# Patient Record
(demographics unavailable — no encounter records)

---

## 2019-06-10 NOTE — UC
Laceration HPI





- HPI Summary


HPI Summary: 





13 yo female fell running down a hill


Lacerated right knee 





occurred about one hour PTA








minimal pain





Td up to date





- History Of Current Complaint


Chief Complaint: UCLaceration


Stated Complaint: KNEE LAC


Time Seen by Provider: 06/10/19 12:30


Hx Obtained From: Patient


Hx Last Menstrual Period: end of may


Laceration Location: Knee


Mechanism Of Injury: Blunt Trauma


Onset/Duration: Sudden Onset


Severity: Mild


Pain Intensity: 0


Pain Scale Used: 0-10 Numeric


Aggravating Factors: Nothing


Full Body (No Head): 


  __________________________














  __________________________





 1 - lac








- Allergies/Home Medications


Allergies/Adverse Reactions: 


 Allergies











Allergy/AdvReac Type Severity Reaction Status Date / Time


 


No Known Allergies Allergy   Verified 06/10/19 12:29











Home Medications: 


 Home Medications





NK [No Home Medications Reported]  06/10/19 [History Confirmed 06/10/19]











PMH/Surg Hx/FS Hx/Imm Hx


Previously Healthy: Yes





- Surgical History


Surgical History: None





- Family History


Known Family History: Positive: Hypertension, Other - CA





- Social History


Alcohol Use: None


Substance Use Type: None


Smoking Status (MU): Never Smoked Tobacco





- Immunization History


Most Recent Tetanus Shot: unsure


Vaccination Up to Date: Yes





Review of Systems


All Other Systems Reviewed And Are Negative: Yes


Constitutional: Positive: Negative


Skin: Positive: Negative


Eyes: Positive: Negative


ENT: Positive: Negative


Respiratory: Positive: Negative


Cardiovascular: Positive: Negative


Gastrointestinal: Positive: Negative


Genitourinary: Positive: Negative


Motor: Positive: Negative


Neurovascular: Positive: Negative


Musculoskeletal: Positive: Negative


Neurological: Positive: Negative


Psychological: Positive: Negative





Physical Exam


Triage Information Reviewed: Yes


Appearance: Well-Appearing, No Pain Distress, Well-Nourished


Vital Signs: 


 Initial Vital Signs











Temp  98.4 F   06/10/19 12:24


 


Pulse  78   06/10/19 12:24


 


Resp  16   06/10/19 12:24


 


BP  106/68   06/10/19 12:24


 


Pulse Ox  100   06/10/19 12:24











Vital Signs Reviewed: Yes


Eyes: Positive: Conjunctiva Clear


ENT: Positive: Hearing grossly normal.  Negative: Nasal congestion, Nasal 

drainage, Trismus, Muffled voice, Hoarse voice


Neck: Positive: Supple, Nontender


Cardiovascular: Positive: RRR, No Murmur


Musculoskeletal: Positive: ROM Intact, No Edema


Neurological: Positive: Alert


Psychological Exam: Normal


Skin Exam: Other - lac right knee





Laceration Repair





- Laceration Repair


  ** 1


Description: Linear


Laceration Size After Repair: Length (cm) - 1.2, Width (mm) - 3, Depth (mm) - 2


Debridement: minimal


Type Injection: Local


Anesthesia Used: 2.0% Lido


Additive Used (in ml): Epi


Cleansing Completed Via Routine Prep: Yes


Irrigation With Pressure Irrigation Device: Yes


Closure Material: Sutures


Closure Method: Single Layer


Suture Of: Skin


Suture Type: Nylon - 4 4-0 nylon





Laceration Course/Dx





- Diagnosis


Provider Diagnosis: 


 Laceration of right knee








Discharge





- Sign-Out/Discharge


Documenting (check all that apply): Patient Departure


All imaging exams completed and their final reports reviewed: No Studies





- Discharge Plan


Condition: Stable


Disposition: HOME


Patient Education Materials:  Laceration (ED)


Referrals: 


Daisy Jhaveri DO [Primary Care Provider] - 


Additional Instructions: 


gently clean twice daily with soap and water





gently dry


aplpy a thin film of ointment (I like aquaphor healing ointment or polytrim)


bandaid for a few days


you may keep open to air perioidcally after a few days





sutures should be removed in 12 - 14 days





recheck for any concerns





the more you can take it easy (especially the first few days) the better the 

final results scar wise





- Billing Disposition and Condition


Condition: STABLE


Disposition: Home